# Patient Record
(demographics unavailable — no encounter records)

---

## 2017-09-29 NOTE — XMS
Continuity of Care Document

 Created on:May 4, 2017



Patient:LATISHA INIGUEZ

Sex:Male

:1938

External Reference #:[id number]





Demographics







 Address  [address] [place] [address]

 

 Phone  [phone]

 

 Preferred Language  Unknown

 

 Marital Status  Unknown

 

 Mu-ism Affiliation  Unknown

 

 Race  Unknown

 

 Additional Race(s)  Unavailable

 

 Ethnic Group  Unknown









Author







 Organization  [facility]









Support







 Name  Relationship  Address  Phone

 

 Chi John  Caregiver  [address]   Unavailable



     [place] [address]  

 

 NOLOCAL, PRIMARY  Caregiver  Unavailable  Unavailable

 

 LORRAINE INIGUEZ  Next of Kin  [address]  Unavailable



     [place] [address]  









Care Team Providers







 Name  Role  Phone

 

 NOLOCAL, PRIMARY  Primary Care Physician  Unavailable









Insurance Providers







 Payer Name  Policy Number  Subscriber Name  Relationship

 

 [employer] MEDICARE ADVANTAGE PPO  [id number]  LATISHA INIGUEZ  SELF/SAME AS PATIENT

 

  FOR LIFE  2ND TO [employer]  [id number]  LATISHA INIGUEZ  SELF/SAME AS PATIENT







Advance Directives







 Directive  Response  Recorded Date/Time

 

 Advance Directive?  N  17 7:33am

 

 Living Will?  N  17 7:33am

 

 Health Care Proxy?  N  17 7:33am

 

 Healthcare Power of ?  N  17 7:33am

 

 Is the patient an Organ Donor?  N  17 7:33am







Chief Complaint and Reason for Visit







 Reason for Visit  TICK BITE







Problems

Active Medical Problems







 Problem  Onset Date  Recorded Date  Status

 

 Superficial foreign body of scrotum  Unknown  17  Active

 

 Tick bite of scrotum  Unknown  17  Active





Active Surgical Problems







 Problem  Onset Date  Recorded Date  Status

 

 S/P laparoscopic cholecystectomy  Unknown  16  Active







Medications

Current Home Medications







 Medication  Dose  Units  Route  Directions  Days/Qty  Instructions  Start



               Date

 

 ASCORBIC ACID (VITAMIN  500  MG  PO  EVERY DAY @      



 C 500 MG) 500 MG CHW        0900      

 

 ASPIRIN (ASPIRIN 81 MG  81  MG  PO  EVERY DAY @      



 (LOW DOSE)) 81 MG TAB        0900      

 

 COENZYME Q10  50  MG  PO  EVERY DAY @      



 (UBIDECARENONE)        0900      



 (COENZYME Q-10 50 MG              



 CAP) 50 MG CAP              

 

 Doxycycline  100  MG  PO  TWICE A DAY  14    17



 (Monohydrate)        (0900; 2100)      



 (Doxycycline) 100 MG              



 CAP              

 

 Dutasteride (AVODART  0.5  MG  PO  EVERY EVENING      



 0.5 MG CAP) 0.5 MG CAP        (1700)      

 

 FLUTICASONE PROPIONATE  50  MCG  NA  EVERY DAY @    2 SPRAYS EACH  



 (NASAL) (FLUTICASONE        0900    NOSTRIL  



 NASAL SPRAY) 50 MCG SPR              

 

 HYDROCHLOROTHIAZIDE  12.5  MG  PO  EVERY DAY @      



 (HYDROCHLOROTHIAZIDE        0900      



 12.5 MG CAP) 12.5 MG              



 CAP              

 

 Lovastatin (MEVACOR 20  20  MG  PO  AT BEDTIME      



 MG TAB) 20 MG TAB        (2100)      

 

 Multivitamin (Mvi-12)  1  TAB  PO  EVERY DAY @      



 (Mvi)  TAB        0900      

 

 Mupirocin 2% Top OINT  1  %  EX  TWICE A DAY  1    17



 (BACTROBAN 2% OINTMENT)        (0900; 2100)      



 2 % OIN              

 

 Omega-3-Acid Ethyl  1  GM  PO  EVERY DAY @      



 Esters (LOVAZA 1 GM        0900      



 CAP) 1 GM CAP              

 

 Pregabalin (LYRICA 50  50  MG  PO  EVERY DAY @      



 MG CAP) 50 MG CAP        0900      

 

 Pregabalin (LYRICA 50  100  MG  PO  EVERY EVENING      



 MG CAP) 50 MG CAP        (1700)      

 

 TERAZOSIN HCL  20  MG  PO  AT BEDTIME      



 (TERAZOSIN 10 MG        (2100)      



 CAPSULE) 10 MG CAP              







Social History







 Problem  Response  Recorded Date

 

 Recreational drugs?  N  16

 

 Alcohol?  N  17











 Query  Response  Start Date  Stop Date

 

 Smoking Status:  Former Smoker  63







Hospital Discharge Instructions

No hospital discharge instructions.



Plan of Care







 Discharge Date  17

 

 Disposition  HOME/SELF CARE

 

 Condition at Discharge  STABLE

 

 Instructions/Education Provided  How to Remove a Tick



   DI for Removal of Foreign Body From Skin

 

 Forms Provided  Discharge Form

 

 Prescriptions  See Medications Section

 

 Referrals  NOLOCAL,PRIMARY CARE DOC -

 

 Additional Instructions/Education  Clean area twice a day with soap and water.
  Apply antibiotic ointment twice a



   day until area healed.



   



   Take Antibiotics as prescribed. Take Tylenol or Ibuprofen as directed for 
pain.



   



   Follow up with PCP in 3 to 4 days if symptoms not improving.



   



   Seek Medical Attention if any new, changing or worsening symptoms.







Functional Status

No functional status results.



Allergies, Adverse Reactions, Alerts







 Allergen  Type  Severity  Reaction  Status  Last Updated

 

 Neomycin  Allergy  Unknown    Active  14

 

 Penicillins  Allergy  Unknown    Active  08/24/10

 

 Sulfonamide Derivatives  Allergy  Unknown    Active  08/24/10







Immunizations

No Known History of Immunizations.



Vital Signs







 Vital Reading  Collection Date/Time  Result

 

 Blood Pressure  17 10:32am  160/76

 

 Patient Temperature  17 10:32am  97.8

 

 Temperature Source  17 7:30am  Tympanic

 

 Respiratory Rate  16 10:22am  18

 

 Pulse Rate  17 10:32am  74

 

 Bedside Pulse Oximetry  17 10:32am  98

 

 Height  17 7:30am  182.88 cm

 

 Height  17 7:30am  6 ft 0.00 in

 

 Weight  17 7:30am  86.183 kg

 

 Weight  17 7:30am  190 lb 0.00 oz

 

 Body Mass Index  17 7:30am  25.8







Results

No known relevant diagnostic tests, laboratory data and/or discharge summary.



Procedures

No Known History of Procedures.



Encounters







 Encounter  Location  Arrival/Admit Date  Discharge/Depart Date  Attending



         Provider

 

 Departed  [place]  17 7:29am  17 10:25am  Chi John  [facility]      [place]



   [facility]      











 Encounter Diagnosis

 

 Superficial foreign body of scrotum

 

 Tick bite of scrotum

## 2017-09-29 NOTE — SJPRAD
CHEST 2 VIEWS:

 

HISTORY: 

Dyspnea.

 

FINDINGS: 

The heart size is upper limits of normal.  There are arthrosclerotic changes of the aorta.  The lung
s are clear of infiltrates.  There are arthritic changes of the spine.

 

IMPRESSION: 

No active intrathoracic disease.

 

POS: [facility]

## 2019-11-06 NOTE — ULT
BILATERAL RENAL ULTRASOUND:



HISTORY: 

Evaluate for hydronephrosis.



COMPARISON: 

7/27/2019.



FINDINGS: 

Right kidney: Normal cortical echotexture. No hydronephrosis. There is right renal cortical thinning.
 Echogenic focus in the right renal cortex measuring 0.5 cm.

Right kidney measurements: 5.2 x 10.1 x 5.0 cm. 

Left kidney: Normal cortical echotexture. Mild fullness of the lower pole pelvis. There is a left rich
al cortical thinning.

Left kidney measurements 6.3 x 11.8 x 7.1 cm. 

Urinary bladder: Slight irregularity involving the urinary bladder mucosa. There is a left ureteral j
et is appreciated. Right ureteral jet is not identified.

Prostate: Enlarged, lobulated heterogeneous measuring 3.7 x 5.0 x 6.7 cm.



IMPRESSION: 

1. Mild fullness of the left lower pole pelvis.

2. Enlarged heterogeneous prostate gland.

3. Irregular urinary bladder mucosa. Consider cystoscopy.

Transcribed Date/Time: 11/6/2019 2:50 PM



Reported By: Savana Early 

Electronically Signed:  11/6/2019 3:03 PM

## 2019-11-07 NOTE — ULT
LEFT LOWER EXTREMITY VENOUS DUPLEX EXAM:

11/7/19

 

HISTORY: 

Left leg pain and swelling.

 

Real time color Doppler evaluation of the left lower extremity was performed from groin to calf. This
 includes evaluation of the common femoral, superficial and profunda femoral, saphenous, popliteal, a
nd posterior tibial veins. This shows a patent deep venous system. There is normal compressibility an
d augmentation. There is no evidence of DVT. 

 

IMPRESSION: 

No evidence of DVT of the left lower extremity. 

 

POS: [facility]

## 2019-11-15 NOTE — RAD
Exam: Chest 2 views



HISTORY:Cough



Comparison: 8/22/2019



FINDINGS:



Lungs: Mild atelectasis is seen in the right lung base, adjacent elevation of right hemidiaphragm

Cardiac silhouette:Enlarged cardiac silhouette

Pulmonary vessels: Stable appearing

Pleural Spaces: Clear

Pneumothorax: None



Osseous abnormalities: None of acuity.



IMPRESSION: 

Enlarged cardiac silhouette. Correlate for evidence of CHF.

Mild right basilar atelectasis.



Reported By: Alessandro Dukes 

Electronically Signed:  11/15/2019 4:32 PM